# Patient Record
Sex: MALE | Race: AMERICAN INDIAN OR ALASKA NATIVE | URBAN - NONMETROPOLITAN AREA
[De-identification: names, ages, dates, MRNs, and addresses within clinical notes are randomized per-mention and may not be internally consistent; named-entity substitution may affect disease eponyms.]

---

## 2022-02-04 ENCOUNTER — OFFICE VISIT (OUTPATIENT)
Dept: URBAN - NONMETROPOLITAN AREA CLINIC 14 | Facility: CLINIC | Age: 27
End: 2022-02-04
Payer: COMMERCIAL

## 2022-02-04 DIAGNOSIS — H52.222 REGULAR ASTIGMATISM, LEFT EYE: Primary | ICD-10-CM

## 2022-02-04 PROCEDURE — 92004 COMPRE OPH EXAM NEW PT 1/>: CPT | Performed by: OPTOMETRIST

## 2022-02-04 ASSESSMENT — INTRAOCULAR PRESSURE
OS: 16
OD: 16

## 2023-02-01 ENCOUNTER — OFFICE VISIT (OUTPATIENT)
Dept: URBAN - NONMETROPOLITAN AREA CLINIC 14 | Facility: CLINIC | Age: 28
End: 2023-02-01
Payer: COMMERCIAL

## 2023-02-01 DIAGNOSIS — H52.222 REGULAR ASTIGMATISM, LEFT EYE: Primary | ICD-10-CM

## 2023-02-01 PROCEDURE — 92014 COMPRE OPH EXAM EST PT 1/>: CPT | Performed by: OPTOMETRIST

## 2023-02-01 ASSESSMENT — INTRAOCULAR PRESSURE
OD: 15
OS: 17

## 2023-02-01 ASSESSMENT — VISUAL ACUITY
OD: 20/20
OS: 20/20

## 2023-02-01 NOTE — IMPRESSION/PLAN
Impression: Regular astigmatism, left eye: H52.222. Plan: Glasses Rx given. Discussed possible adaptation period.

## 2024-02-01 ENCOUNTER — OFFICE VISIT (OUTPATIENT)
Dept: URBAN - NONMETROPOLITAN AREA CLINIC 14 | Facility: CLINIC | Age: 29
End: 2024-02-01
Payer: COMMERCIAL

## 2024-02-01 DIAGNOSIS — H52.222 REGULAR ASTIGMATISM, LEFT EYE: Primary | ICD-10-CM

## 2024-02-01 PROCEDURE — 92014 COMPRE OPH EXAM EST PT 1/>: CPT | Performed by: OPTOMETRIST

## 2024-02-01 ASSESSMENT — INTRAOCULAR PRESSURE
OS: 15
OD: 14

## 2024-02-01 ASSESSMENT — VISUAL ACUITY
OD: 20/20
OS: 20/20

## 2025-02-03 ENCOUNTER — OFFICE VISIT (OUTPATIENT)
Dept: URBAN - NONMETROPOLITAN AREA CLINIC 14 | Facility: CLINIC | Age: 30
End: 2025-02-03
Payer: COMMERCIAL

## 2025-02-03 DIAGNOSIS — H52.222 REGULAR ASTIGMATISM, LEFT EYE: Primary | ICD-10-CM

## 2025-02-03 DIAGNOSIS — H52.03 HYPERMETROPIA, BILATERAL: ICD-10-CM

## 2025-02-03 PROCEDURE — 92014 COMPRE OPH EXAM EST PT 1/>: CPT | Performed by: OPTOMETRIST

## 2025-02-03 ASSESSMENT — VISUAL ACUITY
OS: 20/20
OD: 20/20

## 2025-02-03 ASSESSMENT — INTRAOCULAR PRESSURE
OS: 14
OD: 13